# Patient Record
Sex: MALE | URBAN - METROPOLITAN AREA
[De-identification: names, ages, dates, MRNs, and addresses within clinical notes are randomized per-mention and may not be internally consistent; named-entity substitution may affect disease eponyms.]

---

## 2021-08-25 ENCOUNTER — OFFICE VISIT (OUTPATIENT)
Dept: URBAN - METROPOLITAN AREA CLINIC 23 | Facility: CLINIC | Age: 66
End: 2021-08-25
Payer: COMMERCIAL

## 2021-08-25 DIAGNOSIS — H25.813 COMBINED FORMS OF AGE-RELATED CATARACT, BILATERAL: ICD-10-CM

## 2021-08-25 DIAGNOSIS — E11.9 TYPE 2 DIABETES MELLITUS W/O COMPLICATION: Primary | ICD-10-CM

## 2021-08-25 PROCEDURE — 99203 OFFICE O/P NEW LOW 30 MIN: CPT | Performed by: OPTOMETRIST

## 2021-08-25 ASSESSMENT — INTRAOCULAR PRESSURE
OS: 16
OD: 14

## 2021-08-25 ASSESSMENT — KERATOMETRY
OD: 44.75
OS: 44.25

## 2021-08-25 NOTE — IMPRESSION/PLAN
Impression: Type 2 diabetes mellitus w/o complication: N09.4. Plan: Discussed OPTOS photos, no diabetic retinopathy seen. Pt will continue to monitor Vision changes and blood sugar levels.